# Patient Record
Sex: FEMALE | Race: ASIAN | NOT HISPANIC OR LATINO | URBAN - METROPOLITAN AREA
[De-identification: names, ages, dates, MRNs, and addresses within clinical notes are randomized per-mention and may not be internally consistent; named-entity substitution may affect disease eponyms.]

---

## 2024-08-31 ENCOUNTER — EMERGENCY (EMERGENCY)
Facility: HOSPITAL | Age: 69
LOS: 1 days | Discharge: HOME | End: 2024-08-31
Attending: EMERGENCY MEDICINE | Admitting: EMERGENCY MEDICINE
Payer: MEDICARE

## 2024-08-31 VITALS
TEMPERATURE: 97 F | HEART RATE: 62 BPM | RESPIRATION RATE: 14 BRPM | OXYGEN SATURATION: 99 % | DIASTOLIC BLOOD PRESSURE: 76 MMHG | SYSTOLIC BLOOD PRESSURE: 164 MMHG

## 2024-08-31 VITALS
HEART RATE: 78 BPM | DIASTOLIC BLOOD PRESSURE: 78 MMHG | HEIGHT: 67 IN | SYSTOLIC BLOOD PRESSURE: 156 MMHG | RESPIRATION RATE: 18 BRPM | WEIGHT: 177.91 LBS | OXYGEN SATURATION: 100 % | TEMPERATURE: 98 F

## 2024-08-31 LAB
ADD ON TEST-SPECIMEN IN LAB: SIGNIFICANT CHANGE UP
ALBUMIN SERPL ELPH-MCNC: 4.2 G/DL — SIGNIFICANT CHANGE UP (ref 3.3–5)
ALP SERPL-CCNC: 50 U/L — SIGNIFICANT CHANGE UP (ref 40–120)
ALT FLD-CCNC: 11 U/L — SIGNIFICANT CHANGE UP (ref 4–33)
ANION GAP SERPL CALC-SCNC: 11 MMOL/L — SIGNIFICANT CHANGE UP (ref 7–14)
AST SERPL-CCNC: 19 U/L — SIGNIFICANT CHANGE UP (ref 4–32)
BASOPHILS # BLD AUTO: 0.05 K/UL — SIGNIFICANT CHANGE UP (ref 0–0.2)
BASOPHILS NFR BLD AUTO: 0.9 % — SIGNIFICANT CHANGE UP (ref 0–2)
BILIRUB SERPL-MCNC: 0.4 MG/DL — SIGNIFICANT CHANGE UP (ref 0.2–1.2)
BUN SERPL-MCNC: 18 MG/DL — SIGNIFICANT CHANGE UP (ref 7–23)
CALCIUM SERPL-MCNC: 9.8 MG/DL — SIGNIFICANT CHANGE UP (ref 8.4–10.5)
CHLORIDE SERPL-SCNC: 103 MMOL/L — SIGNIFICANT CHANGE UP (ref 98–107)
CO2 SERPL-SCNC: 23 MMOL/L — SIGNIFICANT CHANGE UP (ref 22–31)
CREAT SERPL-MCNC: 0.96 MG/DL — SIGNIFICANT CHANGE UP (ref 0.5–1.3)
EGFR: 64 ML/MIN/1.73M2 — SIGNIFICANT CHANGE UP
EOSINOPHIL # BLD AUTO: 0.07 K/UL — SIGNIFICANT CHANGE UP (ref 0–0.5)
EOSINOPHIL NFR BLD AUTO: 1.2 % — SIGNIFICANT CHANGE UP (ref 0–6)
GLUCOSE SERPL-MCNC: 123 MG/DL — HIGH (ref 70–99)
HCT VFR BLD CALC: 35 % — SIGNIFICANT CHANGE UP (ref 34.5–45)
HGB BLD-MCNC: 11.3 G/DL — LOW (ref 11.5–15.5)
IANC: 3.42 K/UL — SIGNIFICANT CHANGE UP (ref 1.8–7.4)
IMM GRANULOCYTES NFR BLD AUTO: 0.3 % — SIGNIFICANT CHANGE UP (ref 0–0.9)
LIDOCAIN IGE QN: 41 U/L — SIGNIFICANT CHANGE UP (ref 7–60)
LYMPHOCYTES # BLD AUTO: 1.7 K/UL — SIGNIFICANT CHANGE UP (ref 1–3.3)
LYMPHOCYTES # BLD AUTO: 29.7 % — SIGNIFICANT CHANGE UP (ref 13–44)
MCHC RBC-ENTMCNC: 27.2 PG — SIGNIFICANT CHANGE UP (ref 27–34)
MCHC RBC-ENTMCNC: 32.3 GM/DL — SIGNIFICANT CHANGE UP (ref 32–36)
MCV RBC AUTO: 84.3 FL — SIGNIFICANT CHANGE UP (ref 80–100)
MONOCYTES # BLD AUTO: 0.47 K/UL — SIGNIFICANT CHANGE UP (ref 0–0.9)
MONOCYTES NFR BLD AUTO: 8.2 % — SIGNIFICANT CHANGE UP (ref 2–14)
NEUTROPHILS # BLD AUTO: 3.42 K/UL — SIGNIFICANT CHANGE UP (ref 1.8–7.4)
NEUTROPHILS NFR BLD AUTO: 59.7 % — SIGNIFICANT CHANGE UP (ref 43–77)
NRBC # BLD: 0 /100 WBCS — SIGNIFICANT CHANGE UP (ref 0–0)
NRBC # FLD: 0 K/UL — SIGNIFICANT CHANGE UP (ref 0–0)
PLATELET # BLD AUTO: 203 K/UL — SIGNIFICANT CHANGE UP (ref 150–400)
POTASSIUM SERPL-MCNC: 4.5 MMOL/L — SIGNIFICANT CHANGE UP (ref 3.5–5.3)
POTASSIUM SERPL-SCNC: 4.5 MMOL/L — SIGNIFICANT CHANGE UP (ref 3.5–5.3)
PROT SERPL-MCNC: 7.9 G/DL — SIGNIFICANT CHANGE UP (ref 6–8.3)
RBC # BLD: 4.15 M/UL — SIGNIFICANT CHANGE UP (ref 3.8–5.2)
RBC # FLD: 13.8 % — SIGNIFICANT CHANGE UP (ref 10.3–14.5)
SODIUM SERPL-SCNC: 137 MMOL/L — SIGNIFICANT CHANGE UP (ref 135–145)
TROPONIN T, HIGH SENSITIVITY RESULT: 9 NG/L — SIGNIFICANT CHANGE UP
WBC # BLD: 5.73 K/UL — SIGNIFICANT CHANGE UP (ref 3.8–10.5)
WBC # FLD AUTO: 5.73 K/UL — SIGNIFICANT CHANGE UP (ref 3.8–10.5)

## 2024-08-31 PROCEDURE — 71045 X-RAY EXAM CHEST 1 VIEW: CPT | Mod: 26

## 2024-08-31 PROCEDURE — 93010 ELECTROCARDIOGRAM REPORT: CPT

## 2024-08-31 PROCEDURE — 99285 EMERGENCY DEPT VISIT HI MDM: CPT

## 2024-08-31 RX ORDER — FAMOTIDINE 10 MG/ML
20 INJECTION INTRAVENOUS ONCE
Refills: 0 | Status: COMPLETED | OUTPATIENT
Start: 2024-08-31 | End: 2024-08-31

## 2024-08-31 RX ORDER — ACETAMINOPHEN 325 MG/1
650 TABLET ORAL ONCE
Refills: 0 | Status: COMPLETED | OUTPATIENT
Start: 2024-08-31 | End: 2024-08-31

## 2024-08-31 RX ORDER — ASPIRIN 81 MG
162 TABLET, DELAYED RELEASE (ENTERIC COATED) ORAL ONCE
Refills: 0 | Status: COMPLETED | OUTPATIENT
Start: 2024-08-31 | End: 2024-08-31

## 2024-08-31 RX ADMIN — FAMOTIDINE 20 MILLIGRAM(S): 10 INJECTION INTRAVENOUS at 18:59

## 2024-08-31 RX ADMIN — ACETAMINOPHEN 650 MILLIGRAM(S): 325 TABLET ORAL at 21:15

## 2024-08-31 RX ADMIN — Medication 162 MILLIGRAM(S): at 18:59

## 2024-08-31 NOTE — ED PROVIDER NOTE - PATIENT PORTAL LINK FT
You can access the FollowMyHealth Patient Portal offered by [facility] by registering at the following website: [email or web address]. By joining [facility]’s FollowMyHealth portal, you will also be able to view your health information using other applications (apps) compatible with our system.

## 2024-08-31 NOTE — ED PROVIDER NOTE - NSFOLLOWUPINSTRUCTIONS_ED_ALL_ED_FT
You were seen in the emergency department for chest pain. We have evaluated you and determined that you do not require further hospital interventions.    During your stay you had the following relevant results: an EKG and troponin (blood test) that do not show evidence of a heart attack, a chest X-ray that does not show evidence of a lung infection    Please follow up with a CARDIOLOGIST to discuss the results of your stay in our department.    If you start to experience worsening symptoms such as persistent chest pain, nausea or vomiting, difficulty breathing, please return to the emergency department for further evaluation.

## 2024-08-31 NOTE — ED ADULT TRIAGE NOTE - CHIEF COMPLAINT QUOTE
Pt with c/o chest pain traveling down left arm into back.....I feel short of breath started 5 days ago. hx dm2, htn. , cholesterol

## 2024-08-31 NOTE — ED PROVIDER NOTE - CLINICAL SUMMARY MEDICAL DECISION MAKING FREE TEXT BOX
69 yo F w/ PMHx of DM2, HTN, hysterectomy, and cholecystectomy presents for 4-5 days of chest pain (midsternal) radiating to b/l shoulders (R>L).  Vital signs remarkable for /78.  Physical exam is remarkable for slight epigastric TTP.  EKG with nonischemic changes. Concern for ACS versus pain secondary to reflux versus constipation.  Plan for labs, CXR, and pepcid. 69 yo F w/ PMHx of DM2, HTN, hysterectomy, and cholecystectomy presents for 4-5 days of chest pain (midsternal) radiating to b/l shoulders (R>L).  Vital signs remarkable for /78.  Physical exam is remarkable for slight epigastric TTP.  EKG with nonischemic changes. Concern for ACS versus pain secondary to reflux versus constipation.  Plan for labs, CXR, and pepcid.    Jojo Duncan MD attending physician patient is a 68-year-old woman who comes in with a history of type 2 diabetes hypertension total abdominal hysterectomy and cholecystitis had an echo stress done a month ago that was normal by her cardiologist comes with chest pain that is her left side sternum with some mild epigastric discomfort as well.  She has had it for 4 to 5 days she has not whether she is sitting standing or moving.    Vital signs include blood pressure 156/78 respiratory rate 18 heart rate 78 temp 98 O2 sat 100    My reading EKG shows sinus rhythm first-degree AV block heart rate 73 QTc 475 teaser flipped in V1 and V2 QRS probably mild consistent with right bundle branch block    Physical exam patient awake alert able to communicate well.  Abdomen soft but has mild epigastric tenderness also tenderness on the left parasternal border.    EKG not particularly ischemic in a patient who had negative echo stress a month ago unlikely to be cardiac more likely be costochondritis chest x-ray also done no acute infiltrate

## 2024-08-31 NOTE — ED PROVIDER NOTE - ATTENDING CONTRIBUTION TO CARE
Jojo Duncan MD attending physician patient is a 68-year-old woman who comes in with a history of type 2 diabetes hypertension total abdominal hysterectomy and cholecystitis had an echo stress done a month ago that was normal by her cardiologist comes with chest pain that is her left side sternum with some mild epigastric discomfort as well.  She has had it for 4 to 5 days she has not whether she is sitting standing or moving.    Vital signs include blood pressure 156/78 respiratory rate 18 heart rate 78 temp 98 O2 sat 100    My reading EKG shows sinus rhythm first-degree AV block heart rate 73 QTc 475 teaser flipped in V1 and V2 QRS probably mild consistent with right bundle branch block    Physical exam patient awake alert able to communicate well.  Abdomen soft but has mild epigastric tenderness also tenderness on the left parasternal border.    EKG not particularly ischemic in a patient who had negative echo stress a month ago unlikely to be cardiac more likely be costochondritis chest x-ray also done no acute infiltrate      I performed a history and physical exam of the patient and discussed their management with the resident and /or advanced care provider. I personally made/approved the management plan and take responsibility for the patient management. I reviewed the resident and /or ACP's note and agree with the documented findings and plan of care. My medical decison making and observations are found above.

## 2024-08-31 NOTE — ED ADULT NURSE NOTE - NSFALLRISKINTERV_ED_ALL_ED

## 2024-08-31 NOTE — ED ADULT TRIAGE NOTE - [facility]RIVAL FROM
[facility]    PROGRESS NOTE    Name:  Jessica Barnes   Age:  65 y.o.  Sex:  female  :  1955  MRN:  [id number]   Visit Number:  [id number]  Admission Date:  10/6/2021  Date Of Service:  10/11/21  Primary Care Physician:  Kadeem Arteaga Jr., MD     LOS: 3 days :  Patient Care Team:  Kadeem Arteaga Jr., MD as PCP - General (Family Medicine):    History taken from:     patient chart    Chief Complaint:      Left shoulder pain.    Subjective     Interval History:     Patient has distal fib fracture as well as left humerus fracture.  She has been followed by orthopedic surgery.  As far as her left ankle, surgery is planned for Wednesday morning.  Will need rehab after that.   She also has diabetes mellitus type 2 and hypertension.    Dr. Orta plans on doing ORIF of ankle on Wednesday morning.  Dr. Ramos plans to do a left reverse total shoulder arthroplasty date to be determined.    Patient denies any chest pressure, shortness of breath, nausea/vomiting, fever/chills.  Her pain is under control at present.  She is eager to get the surgeries done with so she can move on to rehab.      Review of Systems:     All systems were reviewed and negative except for:  Musculoskeletal: positive for  Left arm pain.    Objective     Vital Signs:    Temp:  [97.3 °F (36.3 °C)-98.6 °F (37 °C)] 97.8 °F (36.6 °C)  Heart Rate:  [77-94] 88  Resp:  [16-18] 18  BP: (103-126)/(49-58) 119/58    Physical Exam:    General: Alert and oriented x4, obese, mild distress  Heart: Regular rate and rhythm without murmur rub or thrill.  Lungs: Clear to auscultation bilaterally without use of accessory muscles or respiration.  Abdomen: Soft/nontender/nondistended.  No HSM noted.  MSK: Left arm in sling.  4/5 strength in right upper and lower extremities.     Results Review:      I reviewed the patient's new clinical results.  I reviewed the patient's new imaging results and agree with the  interpretation.    Labs:    Lab Results (last 24 hours)     Procedure Component Value Units Date/Time    POC Glucose Once [[id number]]  (Abnormal) Collected: 10/11/21 1024    Specimen: Blood Updated: 10/11/21 1028     Glucose 155 mg/dL      Comment: Meter: [id number] : [id number] Chaitanya SON       Basic Metabolic Panel [[id number]]  (Abnormal) Collected: 10/11/21 0714    Specimen: Blood Updated: 10/11/21 0801     Glucose 143 mg/dL      BUN 47 mg/dL      Creatinine 0.66 mg/dL      Sodium 134 mmol/L      Potassium 4.0 mmol/L      Chloride 97 mmol/L      CO2 25.5 mmol/L      Calcium 8.7 mg/dL      eGFR Non African Amer 90 mL/min/1.73      BUN/Creatinine Ratio 71.2     Anion Gap 11.5 mmol/L     Narrative:      GFR Normal >60  Chronic Kidney Disease <60  Kidney Failure <15      CBC (No Diff) [[id number]]  (Abnormal) Collected: 10/11/21 0714    Specimen: Blood Updated: 10/11/21 0736     WBC 9.49 10*3/mm3      RBC 4.16 10*6/mm3      Hemoglobin 11.3 g/dL      Hematocrit 36.2 %      MCV 87.0 fL      MCH 27.2 pg      MCHC 31.2 g/dL      RDW 14.1 %      RDW-SD 45.2 fl      MPV 9.6 fL      Platelets 264 10*3/mm3     POC Glucose Once [[id number]]  (Abnormal) Collected: 10/11/21 0706    Specimen: Blood Updated: 10/11/21 0708     Glucose 139 mg/dL      Comment: Meter: [id number] : [id number] Talat SON       POC Glucose Once [[id number]]  (Abnormal) Collected: 10/10/21 2109    Specimen: Blood Updated: 10/10/21 2110     Glucose 150 mg/dL      Comment: Meter: [id number] : [id number] Gilliland Saida NA       POC Glucose Once [[id number]]  (Abnormal) Collected: 10/10/21 1545    Specimen: Blood Updated: 10/10/21 1547     Glucose 132 mg/dL      Comment: Meter: [id number] : [id number] Laura SON              Radiology:    Imaging Results (Last 24 Hours)     ** No results found for the last 24 hours. **          Medication Review:     amLODIPine, 10 mg, Oral, Q24H  atorvastatin, 10 mg, Oral,  Daily  cholecalciferol, 1,000 Units, Oral, Daily  insulin glargine, 100 Units, Subcutaneous, Nightly  insulin lispro, 0-9 Units, Subcutaneous, TID With Meals  insulin lispro, 15 Units, Subcutaneous, BID  lisinopril, 20 mg, Oral, Q24H  polyethylene glycol, 17 g, Oral, BID             Assessment/Plan     Principal Problem:    Closed fracture of left distal fibula      Overview: Added automatically from request for surgery [id number]  Active Problems:    Closed fracture of left proximal humerus    Diabetes mellitus (HCC)    Hypertension    BMI 40.0-44.9, adult (HCC)    Hyponatremia    Drug-induced constipation      1.  Closed fracture left distal fibula status post fall  2.  Closed fracture left proximal humerus status post mechanical fall  3.  Diabetes mellitus type 2, hemoglobin A1c 6.60.  4.  Essential hypertension, controlled  5.  Morbid obesity.    Plan:    Await intervention on left ankle on Wednesday.  Await orthopedic input and possible intervention on left proximal humerus.  Monitor blood sugars.  These appear stable.  Monitor blood pressure as well.  Further recommendations will depend on clinical course.    Gibran Diallo DO  10/11/21  14:08 EDT   Home

## 2024-08-31 NOTE — ED PROVIDER NOTE - OBJECTIVE STATEMENT
67 yo F w/ PMHx of DM2, HTN, hysterectomy, and cholecystectomy presents for 4-5 days of chest pain (midsternal) radiating to b/l shoulders (R>L).  She endorses chest pain worse with deep breathing, but denies any worsening with exertion or reproducibility with any movement.  She is followed by a cardiologist (Dr. Hernandez", patient unsure of spelling) and has had an echo/stress test 1 month ago which were both normal.  She has been taking Advil twice daily the past 2 days with minimal improvement in symptoms.  She endorses a history of chronic constipation and feels bloated today, but is passing gas and had a bowel movement earlier today.  She also has a history of reflux. She denies any fever/chills/cough/sore throat, vomiting/diarrhea, history of DVT/PE/cancer/MI/CVA.

## 2024-08-31 NOTE — ED ADULT NURSE NOTE - OBJECTIVE STATEMENT
Break coverage RN Note: Received pt into spot 5, accompanied by daughter. Pt c/o mid sternal chest pain radiating into back and bilateral flank x 4-5 days. Denies any flu-like symptoms, SOB, dizziness, nausea, palpitations. Breathing easy and unlabored, speaking full sentences. Placed on tele monitor SR HR 60s. Pain reproducible with movement. Pt eval by Dr. Duncan . Blood work obtained and sent as ordered. #20 angio cath placed to left antecubital saline locked.

## 2024-08-31 NOTE — ED PROVIDER NOTE - PHYSICAL EXAMINATION
GENERAL: no acute distress, endomorphic body habitus  HEENT: atraumatic, normocephalic, vision grossly intact, EOMI, no conjunctivitis or discharge, hearing grossly intact, no nasal discharge or epistaxis, clear pharynx  CV: regular rate, normal rhythm, normal S1/S2, no murmurs/rubs, no cyanosis  PULM: normal work of breathing, normal O2 saturation on RA, clear breath sounds in b/l upper/lower lung fields, no crackles/rales/rhonchi/wheezing  GI: slight epigastric TTP, soft/nondistended abdomen, no guarding or rebound tenderness, no palpable masses  : no CVA tenderness  NEURO: A&Ox4, follows commands, normal speech, no focal motor or sensory deficits  MSK: no joint tenderness/swelling/erythema, ranging all extremities with no appreciable loss of ROM  EXT: no peripheral edema, no calf tenderness, no redness or swelling  SKIN: warm, dry, and intact, no rashes  PSYCH: appropriate mood and affect

## 2024-09-01 PROCEDURE — 71046 X-RAY EXAM CHEST 2 VIEWS: CPT | Mod: 26

## 2024-09-01 NOTE — ED ADULT NURSE REASSESSMENT NOTE - NS ED NURSE REASSESS COMMENT FT1
Report received from DARIUSZ Reynoso. Pt at baseline mental status, breathing even and unlabored in bed. Pt denies chest pain, SOB, dizziness, headache, blurry vision, chills. Bed in lowest position, call bell within reach. Pt awaiting CXR.